# Patient Record
Sex: FEMALE | Race: WHITE | NOT HISPANIC OR LATINO | Employment: STUDENT | URBAN - METROPOLITAN AREA
[De-identification: names, ages, dates, MRNs, and addresses within clinical notes are randomized per-mention and may not be internally consistent; named-entity substitution may affect disease eponyms.]

---
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
## 2021-04-16 ENCOUNTER — BULK ORDERING (OUTPATIENT)
Dept: CASE MANAGEMENT | Facility: OTHER | Age: 21
End: 2021-04-16

## 2021-04-16 DIAGNOSIS — Z23 IMMUNIZATION DUE: ICD-10-CM

## 2021-08-08 LAB
ALBUMIN SERPL-MCNC: 4.8 G/DL (ref 3.5–5.2)
ALBUMIN/GLOB SERPL: 1.7 G/DL
ALP SERPL-CCNC: 79 U/L (ref 39–117)
ALT SERPL W P-5'-P-CCNC: 16 U/L (ref 1–33)
ANION GAP SERPL CALCULATED.3IONS-SCNC: 14.6 MMOL/L (ref 5–15)
AST SERPL-CCNC: 22 U/L (ref 1–32)
BASOPHILS # BLD AUTO: 0.06 10*3/MM3 (ref 0–0.2)
BASOPHILS NFR BLD AUTO: 0.4 % (ref 0–1.5)
BILIRUB SERPL-MCNC: 0.7 MG/DL (ref 0–1.2)
BUN SERPL-MCNC: 9 MG/DL (ref 6–20)
BUN/CREAT SERPL: 10.1 (ref 7–25)
CALCIUM SPEC-SCNC: 9.7 MG/DL (ref 8.6–10.5)
CHLORIDE SERPL-SCNC: 104 MMOL/L (ref 98–107)
CO2 SERPL-SCNC: 22.4 MMOL/L (ref 22–29)
CREAT SERPL-MCNC: 0.89 MG/DL (ref 0.57–1)
DEPRECATED RDW RBC AUTO: 42.4 FL (ref 37–54)
EOSINOPHIL # BLD AUTO: 0.03 10*3/MM3 (ref 0–0.4)
EOSINOPHIL NFR BLD AUTO: 0.2 % (ref 0.3–6.2)
ERYTHROCYTE [DISTWIDTH] IN BLOOD BY AUTOMATED COUNT: 13 % (ref 12.3–15.4)
GFR SERPL CREATININE-BSD FRML MDRD: 82 ML/MIN/1.73
GLOBULIN UR ELPH-MCNC: 2.9 GM/DL
GLUCOSE SERPL-MCNC: 163 MG/DL (ref 65–99)
HCG SERPL QL: NEGATIVE
HCT VFR BLD AUTO: 45.4 % (ref 34–46.6)
HGB BLD-MCNC: 14.8 G/DL (ref 12–15.9)
IMM GRANULOCYTES # BLD AUTO: 0.04 10*3/MM3 (ref 0–0.05)
IMM GRANULOCYTES NFR BLD AUTO: 0.3 % (ref 0–0.5)
LIPASE SERPL-CCNC: 34 U/L (ref 13–60)
LYMPHOCYTES # BLD AUTO: 0.75 10*3/MM3 (ref 0.7–3.1)
LYMPHOCYTES NFR BLD AUTO: 5.1 % (ref 19.6–45.3)
MCH RBC QN AUTO: 28.5 PG (ref 26.6–33)
MCHC RBC AUTO-ENTMCNC: 32.6 G/DL (ref 31.5–35.7)
MCV RBC AUTO: 87.5 FL (ref 79–97)
MONOCYTES # BLD AUTO: 0.66 10*3/MM3 (ref 0.1–0.9)
MONOCYTES NFR BLD AUTO: 4.5 % (ref 5–12)
NEUTROPHILS NFR BLD AUTO: 13.22 10*3/MM3 (ref 1.7–7)
NEUTROPHILS NFR BLD AUTO: 89.5 % (ref 42.7–76)
NRBC BLD AUTO-RTO: 0 /100 WBC (ref 0–0.2)
PLATELET # BLD AUTO: 267 10*3/MM3 (ref 140–450)
PMV BLD AUTO: 10.8 FL (ref 6–12)
POTASSIUM SERPL-SCNC: 3.8 MMOL/L (ref 3.5–5.2)
PROT SERPL-MCNC: 7.7 G/DL (ref 6–8.5)
RBC # BLD AUTO: 5.19 10*6/MM3 (ref 3.77–5.28)
SODIUM SERPL-SCNC: 141 MMOL/L (ref 136–145)
WBC # BLD AUTO: 14.76 10*3/MM3 (ref 3.4–10.8)

## 2021-08-08 PROCEDURE — 36415 COLL VENOUS BLD VENIPUNCTURE: CPT

## 2021-08-08 PROCEDURE — 84703 CHORIONIC GONADOTROPIN ASSAY: CPT

## 2021-08-08 PROCEDURE — 96374 THER/PROPH/DIAG INJ IV PUSH: CPT

## 2021-08-08 PROCEDURE — 83690 ASSAY OF LIPASE: CPT

## 2021-08-08 PROCEDURE — 85025 COMPLETE CBC W/AUTO DIFF WBC: CPT

## 2021-08-08 PROCEDURE — 80053 COMPREHEN METABOLIC PANEL: CPT

## 2021-08-08 PROCEDURE — 99283 EMERGENCY DEPT VISIT LOW MDM: CPT

## 2021-08-08 RX ORDER — SODIUM CHLORIDE 0.9 % (FLUSH) 0.9 %
10 SYRINGE (ML) INJECTION AS NEEDED
Status: DISCONTINUED | OUTPATIENT
Start: 2021-08-08 | End: 2021-08-09 | Stop reason: HOSPADM

## 2021-08-09 ENCOUNTER — HOSPITAL ENCOUNTER (EMERGENCY)
Facility: HOSPITAL | Age: 21
Discharge: HOME | End: 2021-08-09
Attending: EMERGENCY MEDICINE | Admitting: EMERGENCY MEDICINE

## 2021-08-09 ENCOUNTER — APPOINTMENT (OUTPATIENT)
Dept: CT IMAGING | Facility: HOSPITAL | Age: 21
End: 2021-08-09

## 2021-08-09 VITALS
OXYGEN SATURATION: 97 % | RESPIRATION RATE: 16 BRPM | DIASTOLIC BLOOD PRESSURE: 68 MMHG | HEART RATE: 105 BPM | TEMPERATURE: 97.9 F | SYSTOLIC BLOOD PRESSURE: 110 MMHG

## 2021-08-09 DIAGNOSIS — R11.2 NON-INTRACTABLE VOMITING WITH NAUSEA, UNSPECIFIED VOMITING TYPE: ICD-10-CM

## 2021-08-09 DIAGNOSIS — R10.13 EPIGASTRIC PAIN: Primary | ICD-10-CM

## 2021-08-09 LAB
BACTERIA UR QL AUTO: ABNORMAL /HPF
BILIRUB UR QL STRIP: NEGATIVE
CLARITY UR: ABNORMAL
COLOR UR: ABNORMAL
GLUCOSE UR STRIP-MCNC: NEGATIVE MG/DL
HGB UR QL STRIP.AUTO: NEGATIVE
HOLD SPECIMEN: NORMAL
HYALINE CASTS UR QL AUTO: ABNORMAL /LPF
KETONES UR QL STRIP: ABNORMAL
LEUKOCYTE ESTERASE UR QL STRIP.AUTO: ABNORMAL
NITRITE UR QL STRIP: NEGATIVE
PH UR STRIP.AUTO: 5.5 [PH] (ref 5–8)
PROT UR QL STRIP: ABNORMAL
RBC # UR: ABNORMAL /HPF
REF LAB TEST METHOD: ABNORMAL
SP GR UR STRIP: 1.03 (ref 1–1.03)
SQUAMOUS #/AREA URNS HPF: ABNORMAL /HPF
UROBILINOGEN UR QL STRIP: ABNORMAL
WBC UR QL AUTO: ABNORMAL /HPF
WHOLE BLOOD HOLD SPECIMEN: NORMAL

## 2021-08-09 PROCEDURE — 96374 THER/PROPH/DIAG INJ IV PUSH: CPT

## 2021-08-09 PROCEDURE — 25010000002 IOPAMIDOL 61 % SOLUTION: Performed by: EMERGENCY MEDICINE

## 2021-08-09 PROCEDURE — 25010000002 ONDANSETRON PER 1 MG: Performed by: EMERGENCY MEDICINE

## 2021-08-09 PROCEDURE — 81001 URINALYSIS AUTO W/SCOPE: CPT

## 2021-08-09 PROCEDURE — 74177 CT ABD & PELVIS W/CONTRAST: CPT

## 2021-08-09 PROCEDURE — 63710000001 ONDANSETRON ODT 4 MG TABLET DISPERSIBLE: Performed by: EMERGENCY MEDICINE

## 2021-08-09 RX ORDER — ONDANSETRON 4 MG/1
8 TABLET, ORALLY DISINTEGRATING ORAL ONCE
Status: COMPLETED | OUTPATIENT
Start: 2021-08-09 | End: 2021-08-09

## 2021-08-09 RX ORDER — ONDANSETRON 2 MG/ML
4 INJECTION INTRAMUSCULAR; INTRAVENOUS ONCE
Status: COMPLETED | OUTPATIENT
Start: 2021-08-09 | End: 2021-08-09

## 2021-08-09 RX ORDER — ONDANSETRON 8 MG/1
8 TABLET, ORALLY DISINTEGRATING ORAL EVERY 8 HOURS PRN
Qty: 12 TABLET | Refills: 0 | Status: SHIPPED | OUTPATIENT
Start: 2021-08-09

## 2021-08-09 RX ADMIN — ONDANSETRON 8 MG: 4 TABLET, ORALLY DISINTEGRATING ORAL at 03:21

## 2021-08-09 RX ADMIN — IOPAMIDOL 85 ML: 612 INJECTION, SOLUTION INTRAVENOUS at 01:00

## 2021-08-09 RX ADMIN — SODIUM CHLORIDE 1000 ML: 9 INJECTION, SOLUTION INTRAVENOUS at 00:52

## 2021-08-09 RX ADMIN — ONDANSETRON 4 MG: 2 INJECTION INTRAMUSCULAR; INTRAVENOUS at 00:52

## 2021-08-09 NOTE — ED NOTES
Patient was placed in face mask in first look. Patient was wearing facemask when I entered the room and throughout our encounter. I wore full protective equipment throughout this patient encounter including a face mask, eye shield, gown, and gloves. Hand hygiene was performed before donning protective equipment and after removal when leaving the room.     Cecilia Pina RN  08/09/21 8215

## 2021-08-09 NOTE — ED TRIAGE NOTES
Pt to ED via PV accompanied by mother. Pt vomiting while assisted in by staff to ED triage desk. Pt reports that she ate [employer] around 1900 and shortly after started with generalized abdominal pain, nausea and vomiting. Pt tried Zofran at home with no relief. Pt denies abdominal surgery hx.

## 2021-08-09 NOTE — ED PROVIDER NOTES
EMERGENCY DEPARTMENT ENCOUNTER    CHIEF COMPLAINT  Chief Complaint: Abdominal pain  History given by: Patient  History limited by: None  Room Number: 16/16  PMD: Ngoc Rodriguez MD      HPI:  Pt is a 20 y.o. female who presents complaining of sudden onset of epigastric abdominal discomfort that began just prior to ED arrival today.  The patient states that she was eating an [employer] sandwich when the symptoms began.  She describes the discomfort as a dull sensation to the upper portion of her abdomen and nonradiating.  It is associated with nausea and vomiting but she denies back pain, fever/chills, diarrhea, or chest discomfort.  She states his symptoms are currently moderate in intensity and have been intensifying since onset.  She has taken a Zofran at home prior to ED arrival without improvement of symptoms.  She has continued to vomit despite that medication.  She denies any known aggravating or alleviating factors.  She states that she has had these symptoms previously with intractable vomiting but has had negative work-ups before including EGD/ultrasound.      PAST MEDICAL HISTORY  Active Ambulatory Problems     Diagnosis Date Noted   • No Active Ambulatory Problems     Resolved Ambulatory Problems     Diagnosis Date Noted   • No Resolved Ambulatory Problems     Past Medical History:   Diagnosis Date   • GERD (gastroesophageal reflux disease)        PAST SURGICAL HISTORY  History reviewed. No pertinent surgical history.    FAMILY HISTORY  Family History   Problem Relation Age of Onset   • Colon cancer Father    • Colon polyps Father        SOCIAL HISTORY  Social History     Socioeconomic History   • Marital status: Single     Spouse name: Not on file   • Number of children: Not on file   • Years of education: Not on file   • Highest education level: Not on file   Tobacco Use   • Smoking status: Never Smoker   • Smokeless tobacco: Never Used   Substance and Sexual Activity   • Alcohol use: Yes      Comment: social   • Drug use: Never       ALLERGIES  Patient has no known allergies.    REVIEW OF SYSTEMS  Review of Systems   Constitutional: Negative for fever.   HENT: Negative for sore throat.    Eyes: Negative.    Respiratory: Negative for cough and shortness of breath.    Cardiovascular: Negative for chest pain.   Gastrointestinal: Positive for abdominal pain, nausea and vomiting. Negative for diarrhea.   Genitourinary: Negative for dysuria.   Musculoskeletal: Negative for neck pain.   Skin: Negative for rash.   Allergic/Immunologic: Negative.    Neurological: Negative for weakness, numbness and headaches.   Hematological: Negative.    Psychiatric/Behavioral: Negative.    All other systems reviewed and are negative.      PHYSICAL EXAM  ED Triage Vitals   Temp Heart Rate Resp BP SpO2   08/08/21 2118 08/08/21 2118 08/08/21 2118 08/08/21 2307 08/08/21 2118   97.9 °F (36.6 °C) (!) 124 18 116/81 99 %      Temp src Heart Rate Source Patient Position BP Location FiO2 (%)   08/08/21 2118 08/08/21 2118 -- -- --   Tympanic Monitor          Physical Exam  Vitals and nursing note reviewed.   Constitutional:       General: She is not in acute distress.  HENT:      Head: Normocephalic and atraumatic.   Eyes:      Pupils: Pupils are equal, round, and reactive to light.   Cardiovascular:      Rate and Rhythm: Normal rate and regular rhythm.      Heart sounds: Normal heart sounds.   Pulmonary:      Effort: Pulmonary effort is normal. No respiratory distress.      Breath sounds: Normal breath sounds.   Abdominal:      Palpations: Abdomen is soft.      Tenderness: There is abdominal tenderness in the epigastric area. There is no guarding or rebound.   Musculoskeletal:         General: Normal range of motion.      Cervical back: Normal range of motion and neck supple.   Skin:     General: Skin is warm and dry.      Findings: No rash.   Neurological:      Mental Status: She is alert and oriented to person, place, and time.       Sensory: Sensation is intact.   Psychiatric:         Mood and Affect: Mood and affect normal.         LAB RESULTS  Lab Results (last 24 hours)     Procedure Component Value Units Date/Time    CBC & Differential [[id number]]  (Abnormal) Collected: 08/08/21 2316    Specimen: Blood Updated: 08/08/21 2324    Narrative:      The following orders were created for panel order CBC & Differential.  Procedure                               Abnormality         Status                     ---------                               -----------         ------                     CBC Auto Differential[[id number]]        Abnormal            Final result                 Please view results for these tests on the individual orders.    Comprehensive Metabolic Panel [[id number]]  (Abnormal) Collected: 08/08/21 2316    Specimen: Blood Updated: 08/08/21 2338     Glucose 163 mg/dL      BUN 9 mg/dL      Creatinine 0.89 mg/dL      Sodium 141 mmol/L      Potassium 3.8 mmol/L      Chloride 104 mmol/L      CO2 22.4 mmol/L      Calcium 9.7 mg/dL      Total Protein 7.7 g/dL      Albumin 4.80 g/dL      ALT (SGPT) 16 U/L      AST (SGOT) 22 U/L      Alkaline Phosphatase 79 U/L      Total Bilirubin 0.7 mg/dL      eGFR Non African Amer 82 mL/min/1.73      Globulin 2.9 gm/dL      A/G Ratio 1.7 g/dL      BUN/Creatinine Ratio 10.1     Anion Gap 14.6 mmol/L     Narrative:      GFR Normal >60  Chronic Kidney Disease <60  Kidney Failure <15      Lipase [[id number]]  (Normal) Collected: 08/08/21 2316    Specimen: Blood Updated: 08/08/21 2338     Lipase 34 U/L     hCG, Serum, Qualitative [[id number]]  (Normal) Collected: 08/08/21 2316    Specimen: Blood Updated: 08/08/21 2347     HCG Qualitative Negative    CBC Auto Differential [[id number]]  (Abnormal) Collected: 08/08/21 2316    Specimen: Blood Updated: 08/08/21 2324     WBC 14.76 10*3/mm3      RBC 5.19 10*6/mm3      Hemoglobin 14.8 g/dL      Hematocrit 45.4 %      MCV 87.5 fL      MCH 28.5 pg      MCHC 32.6 g/dL       RDW 13.0 %      RDW-SD 42.4 fl      MPV 10.8 fL      Platelets 267 10*3/mm3      Neutrophil % 89.5 %      Lymphocyte % 5.1 %      Monocyte % 4.5 %      Eosinophil % 0.2 %      Basophil % 0.4 %      Immature Grans % 0.3 %      Neutrophils, Absolute 13.22 10*3/mm3      Lymphocytes, Absolute 0.75 10*3/mm3      Monocytes, Absolute 0.66 10*3/mm3      Eosinophils, Absolute 0.03 10*3/mm3      Basophils, Absolute 0.06 10*3/mm3      Immature Grans, Absolute 0.04 10*3/mm3      nRBC 0.0 /100 WBC     Urinalysis With Microscopic If Indicated (No Culture) - Urine, Clean Catch [[id number]]  (Abnormal) Collected: 08/09/21 0038    Specimen: Urine, Clean Catch Updated: 08/09/21 0058     Color, UA Dark Yellow     Appearance, UA Cloudy     pH, UA 5.5     Specific Gravity, UA 1.029     Glucose, UA Negative     Ketones, UA Trace     Bilirubin, UA Negative     Blood, UA Negative     Protein, UA 30 mg/dL (1+)     Leuk Esterase, UA Trace     Nitrite, UA Negative     Urobilinogen, UA 1.0 E.U./dL    Urinalysis, Microscopic Only - Urine, Clean Catch [[id number]]  (Abnormal) Collected: 08/09/21 0038    Specimen: Urine, Clean Catch Updated: 08/09/21 0106     RBC, UA None Seen /HPF      WBC, UA 3-5 /HPF      Bacteria, UA 3+ /HPF      Squamous Epithelial Cells, UA 3-6 /HPF      Hyaline Casts, UA None Seen /LPF      Methodology Manual Light Microscopy          I ordered the above labs and reviewed the results    RADIOLOGY  CT Abdomen Pelvis With Contrast   Final Result       1. Overall patulous appearance to both large and small bowel loops, as   well as large volume of liquid stool noted throughout the colon.   Appearance could reflect some nonspecific enterocolitis.       Radiation dose reduction techniques were utilized, including automated   exposure control and exposure modulation based on body size.       This report was finalized on 8/9/2021 1:18 AM by Dr. Elvia Salter M.D.               I ordered the above noted radiological studies.  Interpreted by radiologist.  Reviewed by me in PACS.       PROCEDURES  Procedures      PROGRESS AND CONSULTS     The patient was wearing a facemask upon entrance into the room and remained in such throughout their visit.  I was wearing PPE including a facemask, eye protection, as well as gloves at any point entering the room and throughout the visit    0235  On reevaluation, the patient states that she is feeling significantly better following treatment with fluids and IV Zofran. I did inform the patient that her work-up did show slight elevation in her WBC count however unremarkable labs as well as CT scan of the abdomen and pelvis. The patient will be stable for discharge and all questions have been answered.      MEDICAL DECISION MAKING  Results were reviewed/discussed with the patient and they were also made aware of online access. Pt also made aware that some labs, such as cultures, will not be resulted during ER visit and follow up with PMD is necessary.     MDM  Number of Diagnoses or Management Options     Amount and/or Complexity of Data Reviewed  Clinical lab tests: ordered and reviewed  Tests in the radiology section of CPT®: ordered and reviewed  Tests in the medicine section of CPT®: ordered and reviewed  Review and summarize past medical records: yes (There are no previous emergency room records available for review)  Independent visualization of images, tracings, or specimens: yes (Liquid stool seen throughout the colon on CT scan of the abdomen and pelvis, otherwise unremarkable.)           DIAGNOSIS  Final diagnoses:   Epigastric pain   Non-intractable vomiting with nausea, unspecified vomiting type       DISPOSITION  DISCHARGE    Patient discharged in stable condition.    Reviewed implications of results, diagnosis, meds, responsibility to follow up, warning signs and symptoms of possible worsening, potential complications and reasons to return to ER.    Patient/Family voiced understanding of above  instructions.    Discussed plan for discharge, as there is no emergent indication for admission. Patient referred to primary care provider for BP management due to today's BP. Pt/family is agreeable and understands need for follow up and repeat testing.  Pt is aware that discharge does not mean that nothing is wrong but it indicates no emergency is present that requires admission and they must continue care with follow-up as given below or physician of their choice.     FOLLOW-UP  Ngoc Rodriguez MD  [address]  [facility] [address]  [facility] [id number] [phone]    Schedule an appointment as soon as possible for a visit            Medication List      Changed    ondansetron ODT 8 MG disintegrating tablet  Commonly known as: Zofran ODT  Place 1 tablet on the tongue Every 8 (Eight) Hours As Needed for Nausea or Vomiting.  What changed:   · medication strength  · how much to take  · how to take this  · when to take this  · reasons to take this  · additional instructions           Where to Get Your Medications      These medications were sent to [id number] - [place], [place] - [address] [address] AT [facility] OF [place] [address] - [phone]  - [phone] FX  [address] [place] [address] [place] [address]    Phone: [phone]   · ondansetron ODT 8 MG disintegrating tablet           Latest Documented Vital Signs:  As of 06:42 EDT  BP- 110/68 HR- 105 Temp- 97.9 °F (36.6 °C) (Tympanic) O2 sat- 97%         Fabian Pina MD  08/09/21 0362

## 2022-01-17 RX ORDER — AMITRIPTYLINE HYDROCHLORIDE 25 MG/1
TABLET, FILM COATED ORAL
Qty: 30 TABLET | Refills: 1 | Status: SHIPPED | OUTPATIENT
Start: 2022-01-17 | End: 2022-03-22

## 2022-03-22 RX ORDER — AMITRIPTYLINE HYDROCHLORIDE 25 MG/1
TABLET, FILM COATED ORAL
Qty: 30 TABLET | Refills: 1 | Status: SHIPPED | OUTPATIENT
Start: 2022-03-22 | End: 2022-06-24

## 2022-06-24 RX ORDER — AMITRIPTYLINE HYDROCHLORIDE 25 MG/1
TABLET, FILM COATED ORAL
Qty: 30 TABLET | Refills: 0 | Status: SHIPPED | OUTPATIENT
Start: 2022-06-24 | End: 2022-08-11

## 2022-08-11 RX ORDER — AMITRIPTYLINE HYDROCHLORIDE 25 MG/1
TABLET, FILM COATED ORAL
Qty: 30 TABLET | Refills: 0 | Status: SHIPPED | OUTPATIENT
Start: 2022-08-11 | End: 2022-09-13

## 2022-08-22 ENCOUNTER — TELEPHONE (OUTPATIENT)
Dept: GASTROENTEROLOGY | Facility: CLINIC | Age: 22
End: 2022-08-22

## 2022-09-13 RX ORDER — AMITRIPTYLINE HYDROCHLORIDE 25 MG/1
TABLET, FILM COATED ORAL
Qty: 30 TABLET | Refills: 0 | Status: SHIPPED | OUTPATIENT
Start: 2022-09-13 | End: 2022-10-27 | Stop reason: SDUPTHER

## 2022-09-26 ENCOUNTER — TELEPHONE (OUTPATIENT)
Dept: GASTROENTEROLOGY | Facility: CLINIC | Age: 22
End: 2022-09-26

## 2022-10-24 RX ORDER — AMITRIPTYLINE HYDROCHLORIDE 25 MG/1
TABLET, FILM COATED ORAL
Qty: 30 TABLET | Refills: 0 | OUTPATIENT
Start: 2022-10-24

## 2022-10-24 NOTE — TELEPHONE ENCOUNTER
Attempted to reach patient to inform her that her refill for amitriptyline was denied until she's been seen in the office.  Mailbox was full and could not leave a message.

## 2022-10-27 RX ORDER — AMITRIPTYLINE HYDROCHLORIDE 25 MG/1
25 TABLET, FILM COATED ORAL
Qty: 90 TABLET | Refills: 0 | Status: SHIPPED | OUTPATIENT
Start: 2022-10-27 | End: 2023-02-14

## 2022-11-01 ENCOUNTER — OFFICE VISIT (OUTPATIENT)
Dept: GASTROENTEROLOGY | Facility: CLINIC | Age: 22
End: 2022-11-01

## 2022-11-01 DIAGNOSIS — K58.0 IRRITABLE BOWEL SYNDROME WITH DIARRHEA: Primary | Chronic | ICD-10-CM

## 2022-11-01 PROCEDURE — 99441 PR PHYS/QHP TELEPHONE EVALUATION 5-10 MIN: CPT | Performed by: NURSE PRACTITIONER

## 2022-11-01 NOTE — PATIENT INSTRUCTIONS
1.  For IBS-D, continue amitriptyline 25 mg at bedtime.  Refills have been provided.    2.  Recommend annual office follow-up for reassessment of symptoms and medication refills, or sooner should symptoms recur.

## 2022-11-01 NOTE — PROGRESS NOTES
Chief Complaint   Patient presents with   • Follow-up     IBS-D         History of Present Illness  22-year-old female presents today for telephone follow-up for evaluation of IBS DSD.  She was last seen on 9/10/2020.  She currently treats with amitriptyline 25 mg at bedtime.  With this regimen she has an average of 1 bowel movement daily.  She denies any abdominal pain, cramping, melena, or hematochezia.  Her weight is stable.  She has not required any further use of dicyclomine or Zofran.    She denies any upper GI symptoms such as heartburn, reflux, nausea, vomiting, or dysphagia.    You have chosen to receive care through a telephone visit.   Do you consent to use a telephone visit for your medical care today? Yes    Review of Systems   Constitutional: Negative for fever and unexpected weight change.   HENT: Negative for trouble swallowing.    Cardiovascular: Negative for chest pain.   Gastrointestinal: Negative for abdominal distention, abdominal pain, anal bleeding, blood in stool, constipation, diarrhea, nausea, rectal pain and vomiting.      Result Review :      Office Visit with Ngoc Malone APRN (09/10/2020)  ENDOSCOPY, INT (09/01/2020)  Tissue Pathology Exam (09/01/2020 08:50)    Assessment and Plan    Diagnoses and all orders for this visit:    1. Irritable bowel syndrome with diarrhea (Primary)         This visit has been rescheduled as a phone visit to comply with patient safety concerns in accordance with CDC recommendations. Total time of discussion was 5 minutes.    Patient Instructions   1.  For IBS-D, continue amitriptyline 25 mg at bedtime.  Refills have been provided.    2.  Recommend annual office follow-up for reassessment of symptoms and medication refills, or sooner should symptoms recur.     Discussion:    Patient's IBS-D symptoms are very well managed with amitriptyline nightly, which we will continue.  She only requires use of dicyclomine or Zofran.  We will plan for annual office  follow-up for reassessment of symptoms and medication refills, or sooner should symptoms worsen/recur.  Patient verbalized understanding of above plan of care and is in agreement.  All questions answered and support provided.    EMR Dragon/Transcription Disclaimer:  This document has been Dictated utilizing Dragon dictation.

## 2023-02-14 RX ORDER — AMITRIPTYLINE HYDROCHLORIDE 25 MG/1
25 TABLET, FILM COATED ORAL
Qty: 90 TABLET | Refills: 0 | Status: SHIPPED | OUTPATIENT
Start: 2023-02-14

## 2023-05-18 RX ORDER — AMITRIPTYLINE HYDROCHLORIDE 25 MG/1
25 TABLET, FILM COATED ORAL
Qty: 90 TABLET | Refills: 1 | Status: SHIPPED | OUTPATIENT
Start: 2023-05-18

## 2023-11-28 ENCOUNTER — TELEMEDICINE (OUTPATIENT)
Dept: GASTROENTEROLOGY | Facility: CLINIC | Age: 23
End: 2023-11-28
Payer: COMMERCIAL

## 2023-11-28 VITALS — BODY MASS INDEX: 20.12 KG/M2 | WEIGHT: 110 LBS

## 2023-11-28 DIAGNOSIS — K58.0 IRRITABLE BOWEL SYNDROME WITH DIARRHEA: Primary | Chronic | ICD-10-CM

## 2023-11-28 PROCEDURE — 99213 OFFICE O/P EST LOW 20 MIN: CPT | Performed by: NURSE PRACTITIONER

## 2023-11-28 RX ORDER — AMITRIPTYLINE HYDROCHLORIDE 25 MG/1
25 TABLET, FILM COATED ORAL
Qty: 90 TABLET | Refills: 3 | Status: SHIPPED | OUTPATIENT
Start: 2023-11-28

## 2023-11-28 RX ORDER — AMITRIPTYLINE HYDROCHLORIDE 25 MG/1
25 TABLET, FILM COATED ORAL
Qty: 90 TABLET | Refills: 0 | Status: SHIPPED | OUTPATIENT
Start: 2023-11-28 | End: 2023-11-28 | Stop reason: SDUPTHER

## 2023-11-28 NOTE — PATIENT INSTRUCTIONS
1.  For IBS-D, continue amitriptyline 25 mg at bedtime.  Refills were previously sent in earlier today to your pharmacy and [facility].    2.  Recommend annual office follow-up for reassessment of symptoms medication refills or sooner should your symptoms worsen/recur.

## 2023-11-28 NOTE — PROGRESS NOTES
Chief Complaint   Patient presents with    Follow-up     IBS-D         History of Present Illness  23-year-old female presents today for telemedicine follow-up.  She was last seen in office on 11/1/2022.  She is a history of IBS-D.  She continues amitriptyline 25 mg at bedtime with great control of symptoms.  She reports having regular daily bowel movements.  She denies any abdominal pain or rectal bleeding.  She denies any upper GI symptoms such as heartburn, reflux, nausea, vomiting, or dysphagia.    You have chosen to receive care through a telehealth visit.  Do you consent to use a video/audio connection for your medical care today? Yes    Physical Exam  Constitutional:       General: She is not in acute distress.     Appearance: Normal appearance. She is well-developed. She is not ill-appearing.   Eyes:      General: No scleral icterus.  Pulmonary:      Effort: No respiratory distress.   Skin:     Coloration: Skin is not jaundiced or pale.   Neurological:      Mental Status: She is alert and oriented to person, place, and time.   Psychiatric:         Mood and Affect: Mood normal.         Behavior: Behavior normal.         Thought Content: Thought content normal.         Judgment: Judgment normal.         Result Review :      Office Visit with Ngoc Malone APRN (11/01/2022)   ENDOSCOPY, INT (09/01/2020)   Tissue Pathology Exam (09/01/2020 08:50)     Assessment and Plan    Diagnoses and all orders for this visit:    1. Irritable bowel syndrome with diarrhea (Primary)          Patient Instructions   1.  For IBS-D, continue amitriptyline 25 mg at bedtime.  Refills were previously sent in earlier today to your pharmacy and [facility].    2.  Recommend annual office follow-up for reassessment of symptoms medication refills or sooner should your symptoms worsen/recur.     Discussion:    Patient to continue amitriptyline 25 mg at bedtime.  Refills have been sent to the patient's pharmacy.  Recommend annual  office follow-up for reassessment of symptoms and medication refills or sooner should the symptoms worsen/recur.  Patient verbalized understand above plan of care and is in agreement.  All questions answered and support provided.    EMR Dragon/Transcription Disclaimer:  This document has been Dictated utilizing Dragon dictation.

## 2025-07-23 NOTE — PROGRESS NOTES
----- Message from Dr. Nitesh Goodwin DO sent at 7/23/2025 12:54 PM EDT -----  Needs coltamy Goodwin DO     Chief Complaint   Patient presents with   • Nausea   • Vomiting   • Abdominal Pain       HPI  Patient is a 20-year-old female who presents today for evaluation.    Patient presents today with concerns about nausea, vomiting, reflux, and abdominal pain.    Patient reports symptoms began 6 days ago.  She woke up feeling very nauseated and had vomiting that occurred in the morning.  By the evening she felt somewhat better.  She reports over the last 6 days she has had continued nausea that is worse in the morning and has been described as being severe.  She has had continued vomiting in the morning.  Emesis is foamy and bilious.  She has had some generalized abdominal pain associated with this described as feeling achy.  She denies any fever or sick contacts.  She denies any blood in the stool or dark stool.  Denies use of NSAIDs.    She had similar symptoms around 18 months ago.  She was seen in urgent care center when it occurred and followed up with her primary care provider afterwards for lab work.  She was started on on Nexium March 2019 for reflux.  She reports the Nexium had generally led to good symptom control but she did have occasional breakthrough symptoms.    She has had no prior imaging or endoscopic evaluation.    Her last menstrual cycle was within the last week.    Review of Systems   Constitutional: Positive for chills and diaphoresis. Negative for appetite change, fatigue, fever and unexpected weight change.   HENT: Negative for dental problem, ear pain, mouth sores, rhinorrhea, sore throat and voice change.    Eyes: Negative for pain, redness and visual disturbance.   Respiratory: Positive for chest tightness. Negative for cough and wheezing.    Cardiovascular: Negative for chest pain, palpitations and leg swelling.   Endocrine: Positive for polydipsia. Negative for cold intolerance, heat intolerance, polyphagia and polyuria.   Genitourinary: Negative for dysuria, frequency, hematuria and urgency.    Musculoskeletal: Positive for back pain. Negative for arthralgias, joint swelling, myalgias and neck pain.   Skin: Negative for rash.   Allergic/Immunologic: Negative for environmental allergies, food allergies and immunocompromised state.   Neurological: Positive for weakness and headaches. Negative for dizziness, seizures and numbness.   Hematological: Does not bruise/bleed easily.   Psychiatric/Behavioral: Negative for sleep disturbance. The patient is not nervous/anxious.         Problem List:  There is no problem list on file for this patient.      Medical History:    Past Medical History:   Diagnosis Date   • GERD (gastroesophageal reflux disease)         Social History:    Social History     Socioeconomic History   • Marital status: Single     Spouse name: Not on file   • Number of children: Not on file   • Years of education: Not on file   • Highest education level: Not on file   Tobacco Use   • Smoking status: Never Smoker   • Smokeless tobacco: Never Used   Substance and Sexual Activity   • Alcohol use: Yes     Comment: social   • Drug use: Never       Family History:   Family History   Problem Relation Age of Onset   • Colon cancer Father    • Colon polyps Father        Surgical History: History reviewed. No pertinent surgical history.      Current Outpatient Medications:   •  esomeprazole (nexIUM) 20 MG capsule, Take 20 mg by mouth Daily., Disp: , Rfl:   •  famotidine (PEPCID) 20 MG tablet, Take 1 tablet by mouth Every Night., Disp: 30 tablet, Rfl: 2  •  ondansetron ODT (ZOFRAN-ODT) 4 MG disintegrating tablet, Dissolve 1 tablet by mouth every 6 hours PRN nausea, vomiting, Disp: 20 tablet, Rfl: 1    Allergies:  Patient has no known allergies.    The following portions of the patient's history were reviewed and updated as appropriate: allergies, current medications, past family history, past medical history, past social history, past surgical history and problem list.    Vitals:    08/28/20 1236   BP:  120/80   Pulse: 75   Resp: 16   Temp: 98.4 °F (36.9 °C)   SpO2: 99%         08/28/20  1236   Weight: 42.5 kg (93 lb 9.6 oz)     Body mass index is 17.12 kg/m².    Physical Exam   Constitutional: She is oriented to person, place, and time. She appears well-developed and well-nourished. No distress.   HENT:   Head: Normocephalic and atraumatic.   Eyes: No scleral icterus.   Cardiovascular: Normal rate and regular rhythm.   Pulmonary/Chest: Effort normal and breath sounds normal. No respiratory distress.   Abdominal: Soft. Bowel sounds are normal. She exhibits no distension. There is no hepatosplenomegaly. There is generalized tenderness.   Neurological: She is alert and oriented to person, place, and time.   Skin: Skin is warm and dry.   Psychiatric: She has a normal mood and affect. Thought content normal.   Vitals reviewed.        Assessment/ Plan  Lesia was seen today for nausea, vomiting and abdominal pain.    Diagnoses and all orders for this visit:    Nausea and vomiting, intractability of vomiting not specified, unspecified vomiting type  -     CBC & Differential  -     Comprehensive Metabolic Panel  -     Celiac Disease Panel  -     Lipase  -     Amylase  -     US Abdomen Complete; Future    Epigastric pain  -     CBC & Differential  -     Comprehensive Metabolic Panel  -     Celiac Disease Panel  -     Lipase  -     Amylase  -     US Abdomen Complete; Future    Gastroesophageal reflux disease, esophagitis presence not specified  -     CBC & Differential  -     Comprehensive Metabolic Panel  -     Celiac Disease Panel  -     Lipase  -     Amylase  -     US Abdomen Complete; Future    Other orders  -     famotidine (PEPCID) 20 MG tablet; Take 1 tablet by mouth Every Night.  -     ondansetron ODT (ZOFRAN-ODT) 4 MG disintegrating tablet; Dissolve 1 tablet by mouth every 6 hours PRN nausea, vomiting         Return for Review results after testing complete.    Patient Instructions   Check lab work  today.    Schedule abdominal ultrasound for further evaluation of symptoms.    Schedule EGD for further evaluation of symptoms.    For nausea and vomiting, may use ondansetron as needed.  Prescription sent to the pharmacy.    For GERD, continue Nexium daily in the morning and start famotidine 20 mg daily in the evening.  Prescription sent to pharmacy.    Follow-up after testing complete.  Call for any new or worsening symptoms.     Discussion:  We will initiate evaluation as outlined above.  If EGD and ultrasound and labs unremarkable and persistent symptoms, may consider gastric emptying study.